# Patient Record
Sex: FEMALE | Race: WHITE | NOT HISPANIC OR LATINO | URBAN - METROPOLITAN AREA
[De-identification: names, ages, dates, MRNs, and addresses within clinical notes are randomized per-mention and may not be internally consistent; named-entity substitution may affect disease eponyms.]

---

## 2017-04-18 ENCOUNTER — RECORDS - HEALTHEAST (OUTPATIENT)
Dept: MAMMOGRAPHY | Facility: CLINIC | Age: 67
End: 2017-04-18

## 2017-04-18 DIAGNOSIS — Z12.31 ENCOUNTER FOR SCREENING MAMMOGRAM FOR MALIGNANT NEOPLASM OF BREAST: ICD-10-CM

## 2018-01-22 ENCOUNTER — RECORDS - HEALTHEAST (OUTPATIENT)
Dept: ADMINISTRATIVE | Facility: OTHER | Age: 68
End: 2018-01-22

## 2018-01-29 ENCOUNTER — HOSPITAL ENCOUNTER (OUTPATIENT)
Dept: CARDIOLOGY | Facility: HOSPITAL | Age: 68
Discharge: HOME | End: 2018-01-29

## 2018-01-29 ENCOUNTER — RECORDS - HEALTHEAST (OUTPATIENT)
Dept: ADMINISTRATIVE | Facility: OTHER | Age: 68
End: 2018-01-29

## 2018-01-29 DIAGNOSIS — I49.9 IRREGULAR HEART BEAT: ICD-10-CM

## 2018-01-29 RX ORDER — NAPROXEN SODIUM 550 MG/1
TABLET ORAL
Status: SHIPPED | COMMUNITY
Start: 2018-01-29

## 2018-01-29 RX ORDER — CLOBETASOL PROPIONATE 0.5 MG/G
OINTMENT TOPICAL
Status: SHIPPED | COMMUNITY
Start: 2018-01-29

## 2018-01-29 RX ORDER — NORTRIPTYLINE HCL 10 MG
10 CAPSULE ORAL
Status: SHIPPED | COMMUNITY
Start: 2018-01-29

## 2018-01-29 RX ORDER — POLYETHYLENE GLYCOL 3350 17 G/17G
17 POWDER, FOR SOLUTION ORAL WEEKLY
Status: SHIPPED | COMMUNITY
Start: 2018-01-29

## 2018-01-29 RX ORDER — CHLORAL HYDRATE 500 MG
2 CAPSULE ORAL EVERY OTHER DAY
Status: SHIPPED | COMMUNITY
Start: 2018-01-29

## 2018-01-29 RX ORDER — TRETINOIN 0.5 MG/G
CREAM TOPICAL PRN
Status: SHIPPED | COMMUNITY
Start: 2018-01-29

## 2018-01-30 LAB
CV STRESS CURRENT BP HE: NORMAL
CV STRESS CURRENT HR HE: 100
CV STRESS CURRENT HR HE: 100
CV STRESS CURRENT HR HE: 103
CV STRESS CURRENT HR HE: 105
CV STRESS CURRENT HR HE: 108
CV STRESS CURRENT HR HE: 108
CV STRESS CURRENT HR HE: 109
CV STRESS CURRENT HR HE: 109
CV STRESS CURRENT HR HE: 111
CV STRESS CURRENT HR HE: 112
CV STRESS CURRENT HR HE: 113
CV STRESS CURRENT HR HE: 115
CV STRESS CURRENT HR HE: 116
CV STRESS CURRENT HR HE: 122
CV STRESS CURRENT HR HE: 128
CV STRESS CURRENT HR HE: 129
CV STRESS CURRENT HR HE: 131
CV STRESS CURRENT HR HE: 133
CV STRESS CURRENT HR HE: 136
CV STRESS CURRENT HR HE: 139
CV STRESS CURRENT HR HE: 139
CV STRESS CURRENT HR HE: 141
CV STRESS CURRENT HR HE: 142
CV STRESS CURRENT HR HE: 144
CV STRESS CURRENT HR HE: 145
CV STRESS CURRENT HR HE: 87
CV STRESS CURRENT HR HE: 92
CV STRESS DEVIATION TIME HE: NORMAL
CV STRESS ECHO PERCENT HR HE: NORMAL
CV STRESS EXERCISE STAGE HE: NORMAL
CV STRESS EXERCISE STAGE REACHED HE: NORMAL
CV STRESS FINAL RESTING BP HE: NORMAL
CV STRESS FINAL RESTING HR HE: 100
CV STRESS MAX HR HE: 145
CV STRESS MAX TREADMILL GRADE HE: 14
CV STRESS MAX TREADMILL SPEED HE: 3.4
CV STRESS PEAK DIA BP HE: NORMAL
CV STRESS PEAK SYS BP HE: NORMAL
CV STRESS PHASE HE: NORMAL
CV STRESS PROTOCOL HE: NORMAL
CV STRESS RESTING PT POSITION HE: NORMAL
CV STRESS RESTING PT POSITION HE: NORMAL
CV STRESS ST DEVIATION AMOUNT HE: NORMAL
CV STRESS ST DEVIATION ELEVATION HE: NORMAL
CV STRESS ST EVELATION AMOUNT HE: NORMAL
CV STRESS TEST TYPE HE: NORMAL
CV STRESS TOTAL STAGE TIME MIN 1 HE: NORMAL
STRESS ECHO BASELINE BP: NORMAL
STRESS ECHO BASELINE HR: 93
STRESS ECHO CALCULATED PERCENT HR: 94 %
STRESS ECHO LAST STRESS BP: NORMAL
STRESS ECHO LAST STRESS HR: 145
STRESS ECHO POST ESTIMATED WORKLOAD: 10.1
STRESS ECHO POST EXERCISE DUR MIN: 8
STRESS ECHO POST EXERCISE DUR SEC: 20
STRESS ECHO TARGET HR: 144.76

## 2018-02-14 ENCOUNTER — RECORDS - HEALTHEAST (OUTPATIENT)
Dept: ADMINISTRATIVE | Facility: OTHER | Age: 68
End: 2018-02-14

## 2018-02-14 ENCOUNTER — AMBULATORY - HEALTHEAST (OUTPATIENT)
Dept: CARDIOLOGY | Facility: CLINIC | Age: 68
End: 2018-02-14

## 2018-02-21 ENCOUNTER — OFFICE VISIT - HEALTHEAST (OUTPATIENT)
Dept: CARDIOLOGY | Facility: CLINIC | Age: 68
End: 2018-02-21

## 2018-02-21 ENCOUNTER — COMMUNICATION - HEALTHEAST (OUTPATIENT)
Dept: CARDIOLOGY | Facility: CLINIC | Age: 68
End: 2018-02-21

## 2018-02-21 DIAGNOSIS — I49.3 ASYMPTOMATIC PVCS: ICD-10-CM

## 2018-02-21 DIAGNOSIS — R94.39 ABNORMAL STRESS ECG: ICD-10-CM

## 2018-02-21 LAB
ANION GAP SERPL CALCULATED.3IONS-SCNC: 7 MMOL/L (ref 5–18)
BUN SERPL-MCNC: 15 MG/DL (ref 8–22)
CALCIUM SERPL-MCNC: 10.1 MG/DL (ref 8.5–10.5)
CHLORIDE BLD-SCNC: 103 MMOL/L (ref 98–107)
CO2 SERPL-SCNC: 31 MMOL/L (ref 22–31)
CREAT SERPL-MCNC: 0.83 MG/DL (ref 0.6–1.1)
GFR SERPL CREATININE-BSD FRML MDRD: >60 ML/MIN/1.73M2
GLUCOSE BLD-MCNC: 78 MG/DL (ref 70–125)
MAGNESIUM SERPL-MCNC: 2.2 MG/DL (ref 1.8–2.6)
POTASSIUM BLD-SCNC: 5.3 MMOL/L (ref 3.5–5)
SODIUM SERPL-SCNC: 141 MMOL/L (ref 136–145)

## 2018-02-21 ASSESSMENT — MIFFLIN-ST. JEOR: SCORE: 1126.43

## 2018-02-27 ENCOUNTER — COMMUNICATION - HEALTHEAST (OUTPATIENT)
Dept: CARDIOLOGY | Facility: CLINIC | Age: 68
End: 2018-02-27

## 2018-03-01 ENCOUNTER — HOSPITAL ENCOUNTER (OUTPATIENT)
Dept: CT IMAGING | Facility: CLINIC | Age: 68
Discharge: HOME | End: 2018-03-01
Attending: INTERNAL MEDICINE

## 2018-03-01 ENCOUNTER — HOSPITAL ENCOUNTER (OUTPATIENT)
Dept: CARDIOLOGY | Facility: CLINIC | Age: 68
Discharge: HOME | End: 2018-03-01
Attending: INTERNAL MEDICINE

## 2018-03-01 DIAGNOSIS — I49.3 ASYMPTOMATIC PVCS: ICD-10-CM

## 2018-03-01 DIAGNOSIS — R94.39 ABNORMAL STRESS ECG: ICD-10-CM

## 2018-03-01 LAB
BSA FOR ECHO PROCEDURE: 1.65 M2
CV CALCIUM SCORE AGATSTON LM: 0
CV CALCIUM SCORING AGATSON LAD: 142
CV CALCIUM SCORING AGATSTON CX: 0
CV CALCIUM SCORING AGATSTON RCA: 16
CV CALCIUM SCORING AGATSTON TOTAL: 158
LEFT VENTRICLE HEART RATE: 72 BPM

## 2018-03-01 ASSESSMENT — MIFFLIN-ST. JEOR
SCORE: 1125.29
SCORE: 1126.43

## 2018-03-02 ENCOUNTER — AMBULATORY - HEALTHEAST (OUTPATIENT)
Dept: CARDIOLOGY | Facility: CLINIC | Age: 68
End: 2018-03-02

## 2018-03-02 DIAGNOSIS — I25.10 CAD (CORONARY ARTERY DISEASE): ICD-10-CM

## 2018-04-25 ENCOUNTER — OFFICE VISIT - HEALTHEAST (OUTPATIENT)
Dept: CARDIOLOGY | Facility: CLINIC | Age: 68
End: 2018-04-25

## 2018-04-25 DIAGNOSIS — I10 ESSENTIAL HYPERTENSION: ICD-10-CM

## 2018-04-25 DIAGNOSIS — I49.3 FREQUENT UNIFOCAL PREMATURE VENTRICULAR CONTRACTIONS: ICD-10-CM

## 2018-04-25 DIAGNOSIS — I25.10 CORONARY ARTERY DISEASE INVOLVING NATIVE CORONARY ARTERY WITHOUT ANGINA PECTORIS: ICD-10-CM

## 2018-04-25 ASSESSMENT — MIFFLIN-ST. JEOR: SCORE: 1126.31

## 2018-07-16 ENCOUNTER — COMMUNICATION - HEALTHEAST (OUTPATIENT)
Dept: CARDIOLOGY | Facility: CLINIC | Age: 68
End: 2018-07-16

## 2018-07-16 DIAGNOSIS — I10 ESSENTIAL HYPERTENSION: ICD-10-CM

## 2018-07-16 DIAGNOSIS — I25.10 CORONARY ARTERY DISEASE INVOLVING NATIVE CORONARY ARTERY WITHOUT ANGINA PECTORIS: ICD-10-CM

## 2018-07-16 DIAGNOSIS — I49.3 FREQUENT UNIFOCAL PREMATURE VENTRICULAR CONTRACTIONS: ICD-10-CM

## 2019-01-22 ENCOUNTER — COMMUNICATION - HEALTHEAST (OUTPATIENT)
Dept: CARDIOLOGY | Facility: CLINIC | Age: 69
End: 2019-01-22

## 2019-01-22 DIAGNOSIS — I25.10 CORONARY ARTERY DISEASE INVOLVING NATIVE CORONARY ARTERY WITHOUT ANGINA PECTORIS: ICD-10-CM

## 2019-01-22 DIAGNOSIS — I10 ESSENTIAL HYPERTENSION: ICD-10-CM

## 2019-01-22 DIAGNOSIS — I49.3 FREQUENT UNIFOCAL PREMATURE VENTRICULAR CONTRACTIONS: ICD-10-CM

## 2019-07-09 ENCOUNTER — RECORDS - HEALTHEAST (OUTPATIENT)
Dept: LAB | Facility: CLINIC | Age: 69
End: 2019-07-09

## 2019-07-10 ENCOUNTER — RECORDS - HEALTHEAST (OUTPATIENT)
Dept: LAB | Facility: CLINIC | Age: 69
End: 2019-07-10

## 2019-07-11 LAB
B BURGDOR IGG+IGM SER QL: 0.06 INDEX VALUE
BACTERIA SPEC CULT: ABNORMAL

## 2019-07-12 LAB
A PHAGOCYTOPH DNA BLD QL NAA+PROBE: NOT DETECTED
E CHAFFEENSIS DNA BLD QL NAA+PROBE: NOT DETECTED
E EWINGII DNA SPEC QL NAA+PROBE: NOT DETECTED
EHRLICHIA DNA SPEC QL NAA+PROBE: NOT DETECTED

## 2019-07-23 ENCOUNTER — COMMUNICATION - HEALTHEAST (OUTPATIENT)
Dept: CARDIOLOGY | Facility: CLINIC | Age: 69
End: 2019-07-23

## 2019-07-23 DIAGNOSIS — I10 ESSENTIAL HYPERTENSION: ICD-10-CM

## 2019-07-23 DIAGNOSIS — I49.3 FREQUENT UNIFOCAL PREMATURE VENTRICULAR CONTRACTIONS: ICD-10-CM

## 2019-07-23 DIAGNOSIS — I25.10 CORONARY ARTERY DISEASE INVOLVING NATIVE CORONARY ARTERY WITHOUT ANGINA PECTORIS: ICD-10-CM

## 2019-08-06 ENCOUNTER — RECORDS - HEALTHEAST (OUTPATIENT)
Dept: ADMINISTRATIVE | Facility: OTHER | Age: 69
End: 2019-08-06

## 2019-08-06 ENCOUNTER — AMBULATORY - HEALTHEAST (OUTPATIENT)
Dept: CARDIOLOGY | Facility: CLINIC | Age: 69
End: 2019-08-06

## 2019-08-15 ENCOUNTER — OFFICE VISIT - HEALTHEAST (OUTPATIENT)
Dept: CARDIOLOGY | Facility: CLINIC | Age: 69
End: 2019-08-15

## 2019-08-15 DIAGNOSIS — I49.3 FREQUENT UNIFOCAL PREMATURE VENTRICULAR CONTRACTIONS: ICD-10-CM

## 2019-08-15 DIAGNOSIS — I25.10 CORONARY ARTERY DISEASE INVOLVING NATIVE CORONARY ARTERY WITHOUT ANGINA PECTORIS: ICD-10-CM

## 2019-08-15 DIAGNOSIS — I10 ESSENTIAL HYPERTENSION: ICD-10-CM

## 2019-08-15 RX ORDER — NITROFURANTOIN 25; 75 MG/1; MG/1
1 CAPSULE ORAL 2 TIMES DAILY
Status: SHIPPED | COMMUNITY
Start: 2019-08-13

## 2019-08-15 ASSESSMENT — MIFFLIN-ST. JEOR: SCORE: 1125.52

## 2020-01-21 ENCOUNTER — COMMUNICATION - HEALTHEAST (OUTPATIENT)
Dept: CARDIOLOGY | Facility: CLINIC | Age: 70
End: 2020-01-21

## 2020-01-21 DIAGNOSIS — I25.10 CORONARY ARTERY DISEASE INVOLVING NATIVE CORONARY ARTERY WITHOUT ANGINA PECTORIS: ICD-10-CM

## 2020-01-21 DIAGNOSIS — I49.3 FREQUENT UNIFOCAL PREMATURE VENTRICULAR CONTRACTIONS: ICD-10-CM

## 2020-01-21 DIAGNOSIS — I10 ESSENTIAL HYPERTENSION: ICD-10-CM

## 2020-07-21 ENCOUNTER — COMMUNICATION - HEALTHEAST (OUTPATIENT)
Dept: CARDIOLOGY | Facility: CLINIC | Age: 70
End: 2020-07-21

## 2020-07-21 DIAGNOSIS — I49.3 FREQUENT UNIFOCAL PREMATURE VENTRICULAR CONTRACTIONS: ICD-10-CM

## 2020-07-21 DIAGNOSIS — I10 ESSENTIAL HYPERTENSION: ICD-10-CM

## 2020-07-21 DIAGNOSIS — I25.10 CORONARY ARTERY DISEASE INVOLVING NATIVE CORONARY ARTERY WITHOUT ANGINA PECTORIS: ICD-10-CM

## 2021-01-18 ENCOUNTER — COMMUNICATION - HEALTHEAST (OUTPATIENT)
Dept: CARDIOLOGY | Facility: CLINIC | Age: 71
End: 2021-01-18

## 2021-01-18 DIAGNOSIS — I10 ESSENTIAL HYPERTENSION: ICD-10-CM

## 2021-01-18 DIAGNOSIS — I49.3 FREQUENT UNIFOCAL PREMATURE VENTRICULAR CONTRACTIONS: ICD-10-CM

## 2021-01-18 DIAGNOSIS — I25.10 CORONARY ARTERY DISEASE INVOLVING NATIVE CORONARY ARTERY WITHOUT ANGINA PECTORIS: ICD-10-CM

## 2021-01-18 RX ORDER — ATORVASTATIN CALCIUM 20 MG/1
TABLET, FILM COATED ORAL
Qty: 90 TABLET | Refills: 0 | Status: SHIPPED | OUTPATIENT
Start: 2021-01-18

## 2021-01-18 RX ORDER — ATENOLOL 25 MG/1
25 TABLET ORAL DAILY
Qty: 90 TABLET | Refills: 0 | Status: SHIPPED | OUTPATIENT
Start: 2021-01-18

## 2021-04-15 ENCOUNTER — COMMUNICATION - HEALTHEAST (OUTPATIENT)
Dept: CARDIOLOGY | Facility: CLINIC | Age: 71
End: 2021-04-15

## 2021-04-15 DIAGNOSIS — I49.3 FREQUENT UNIFOCAL PREMATURE VENTRICULAR CONTRACTIONS: ICD-10-CM

## 2021-04-15 DIAGNOSIS — I10 ESSENTIAL HYPERTENSION: ICD-10-CM

## 2021-04-15 DIAGNOSIS — I25.10 CORONARY ARTERY DISEASE INVOLVING NATIVE CORONARY ARTERY WITHOUT ANGINA PECTORIS: ICD-10-CM

## 2021-05-03 ENCOUNTER — RECORDS - HEALTHEAST (OUTPATIENT)
Dept: LAB | Facility: CLINIC | Age: 71
End: 2021-05-03

## 2021-05-03 LAB
ANION GAP SERPL CALCULATED.3IONS-SCNC: 11 MMOL/L (ref 5–18)
BUN SERPL-MCNC: 19 MG/DL (ref 8–22)
CALCIUM SERPL-MCNC: 9.3 MG/DL (ref 8.5–10.5)
CHLORIDE BLD-SCNC: 102 MMOL/L (ref 98–107)
CHOLEST SERPL-MCNC: 149 MG/DL
CO2 SERPL-SCNC: 27 MMOL/L (ref 22–31)
CREAT SERPL-MCNC: 0.87 MG/DL (ref 0.6–1.1)
FASTING STATUS PATIENT QL REPORTED: NO
GFR SERPL CREATININE-BSD FRML MDRD: >60 ML/MIN/1.73M2
GLUCOSE BLD-MCNC: 89 MG/DL (ref 70–125)
HDLC SERPL-MCNC: 58 MG/DL
LDLC SERPL CALC-MCNC: 75 MG/DL
POTASSIUM BLD-SCNC: 4.6 MMOL/L (ref 3.5–5)
SODIUM SERPL-SCNC: 140 MMOL/L (ref 136–145)
TRIGL SERPL-MCNC: 82 MG/DL

## 2021-05-24 ENCOUNTER — RECORDS - HEALTHEAST (OUTPATIENT)
Dept: ADMINISTRATIVE | Facility: CLINIC | Age: 71
End: 2021-05-24

## 2021-05-25 ENCOUNTER — RECORDS - HEALTHEAST (OUTPATIENT)
Dept: ADMINISTRATIVE | Facility: CLINIC | Age: 71
End: 2021-05-25

## 2021-05-26 ENCOUNTER — RECORDS - HEALTHEAST (OUTPATIENT)
Dept: ADMINISTRATIVE | Facility: CLINIC | Age: 71
End: 2021-05-26

## 2021-05-27 ENCOUNTER — RECORDS - HEALTHEAST (OUTPATIENT)
Dept: ADMINISTRATIVE | Facility: CLINIC | Age: 71
End: 2021-05-27

## 2021-05-28 ENCOUNTER — RECORDS - HEALTHEAST (OUTPATIENT)
Dept: ADMINISTRATIVE | Facility: CLINIC | Age: 71
End: 2021-05-28

## 2021-05-29 ENCOUNTER — RECORDS - HEALTHEAST (OUTPATIENT)
Dept: ADMINISTRATIVE | Facility: CLINIC | Age: 71
End: 2021-05-29

## 2021-05-30 ENCOUNTER — RECORDS - HEALTHEAST (OUTPATIENT)
Dept: ADMINISTRATIVE | Facility: CLINIC | Age: 71
End: 2021-05-30

## 2021-05-31 ENCOUNTER — RECORDS - HEALTHEAST (OUTPATIENT)
Dept: ADMINISTRATIVE | Facility: CLINIC | Age: 71
End: 2021-05-31

## 2021-05-31 NOTE — PATIENT INSTRUCTIONS - HE
Katty Barraza    It was a pleasure to see you at [facility] today.    Your heart rhythm was quite regular today with no evidence for PVCs    Recommend a fasting lipid profile with your primary care physician  Call if symptoms of chest tightness with exertion or shortness of breath  Follow up appointment:   Jacky Nunez MD Dr. Dunbar if needed    Contact Ama at [phone] with questions or concerns.    Liban Valladares MD

## 2021-06-01 ENCOUNTER — RECORDS - HEALTHEAST (OUTPATIENT)
Dept: ADMINISTRATIVE | Facility: CLINIC | Age: 71
End: 2021-06-01

## 2021-06-01 VITALS — BODY MASS INDEX: 20.09 KG/M2 | WEIGHT: 128 LBS | HEIGHT: 67 IN

## 2021-06-01 VITALS — BODY MASS INDEX: 20.89 KG/M2 | WEIGHT: 130 LBS | HEIGHT: 66 IN

## 2021-06-01 VITALS — WEIGHT: 129.1 LBS | BODY MASS INDEX: 20.75 KG/M2 | HEIGHT: 66 IN

## 2021-06-02 ENCOUNTER — RECORDS - HEALTHEAST (OUTPATIENT)
Dept: ADMINISTRATIVE | Facility: CLINIC | Age: 71
End: 2021-06-02

## 2021-06-03 VITALS — HEIGHT: 66 IN | WEIGHT: 129.8 LBS | BODY MASS INDEX: 20.86 KG/M2

## 2021-06-16 PROBLEM — R94.39 ABNORMAL STRESS ECG: Status: ACTIVE | Noted: 2018-02-21

## 2021-06-16 PROBLEM — I25.10 CORONARY ARTERY DISEASE INVOLVING NATIVE CORONARY ARTERY WITHOUT ANGINA PECTORIS: Status: ACTIVE | Noted: 2018-04-25

## 2021-06-16 PROBLEM — I49.3 FREQUENT UNIFOCAL PREMATURE VENTRICULAR CONTRACTIONS: Status: ACTIVE | Noted: 2018-04-25

## 2021-06-16 PROBLEM — I10 ESSENTIAL HYPERTENSION: Status: ACTIVE | Noted: 2018-04-25

## 2021-06-16 NOTE — PROGRESS NOTES
[facility] Care Office Consult     Assessment:     1. Asymptomatic PVCs -appear to be single PVCs in the pattern of bigeminy which increased to couplets and triplets on stress test.  I will check renal profile and magnesium today as well as Holter monitor.  If the ectopy is greater than 10% will refer on to EP since it does appear to be right bundle branch block morphology.  If however less than 10% for greater than 5% try beta-blocker therapy.  If less than 5% would just continue to monitor.  Will inform her of results of blood test.   2. Abnormal stress ECG -minimal horizontal ST segment depression and she had normal coronary angiography in the mid to early 1990s.  Given the equivocal stress test will set her up for CTA.  A CTA declined will need nuclear stress test.      Plan:   1.  Check blood work today looking for magnesium potassium and correct if needed.  2.  Check Holter monitor and depending on percentage of ectopy refer on to EP or consider beta-blocker.  3.  Check CTA given equivocal stress test and address if abnormal.    History of Present Illness:   Thank you for asking the [facility] Care team to see Katty Barraza a 67 y.o.  female  in consultation  to evaluate PVCs.  Patient was being seen in early January due to an upper respiratory tract infection by her primary.  She had been taking DayQuil and NyQuil.  She was noted to have ectopy and ECG confirmed ventricular bigeminy.  She underwent echo showing no abnormalities and subsequent stress test showing couplets and triplets with some minimal ST segment depression.  She was referred here today to discuss further.  She admits to maybe feeling an occasional skip since the stress test over the last several weeks but denies any syncope, dizziness, PND, orthopnea, significant shortness of breath, increased use of caffeine, increased use of alcohol, or any other stimulants.    Past Medical History:   That is post spontaneous vaginal  "delivery    Past history is negative for cancer, tuberculosis, diabetes mellitus, myocardial infarction,  rheumatic fever, hypertension, cerebrovascular accident, chronic kidney disease, peptic ulcer disease, chronic obstructive pulmonary disease, or thyroid disorder  and no lipid disorder.    Past Surgical History:     Past Surgical History:   Procedure Laterality Date     CARDIAC CATHETERIZATION       Family History:   Family history negative for coronary artery disease.    Social History:   She is , lives independently at home with her , works as an , used to smoke up to a pack a day for maybe 10 years having quit 40 years ago, reports that she has quit smoking. She has never used smokeless tobacco.  Drinks a rare glass of wine and weekends.The primary care physician is Argentina Smith MD    Meds:   Scheduled Meds:  Current Outpatient Prescriptions   Medication Sig Dispense Refill     calcium carbonate-vitamin D2 500 mg(1,250mg) -200 unit tablet Take 2 tablets by mouth daily.       clobetasol (TEMOVATE) 0.05 % ointment Apply topically. As directed       cyanocobalamin 1000 MCG tablet Take 1,000 mcg by mouth daily.       naproxen sodium (ANAPROX) 550 MG tablet Take by mouth. 2 pills at onset of headache       nortriptyline (PAMELOR) 10 MG capsule Take 10 mg by mouth. 2 capsules once a day       OMEGA-3/DHA/EPA/FISH OIL (FISH OIL-OMEGA-3 FATTY ACIDS) 300-1,000 mg capsule Take 2 g by mouth every other day.       polyethylene glycol (GLYCOLAX) 17 gram/dose powder Take 17 g by mouth once a week.       tretinoin (RETIN-A) 0.05 % cream Apply topically as needed.       No current facility-administered medications for this visit.      PRN Meds:.    Allergies:   Augmentin [amoxicillin-pot clavulanate]    Objective:      Physical Exam  130 lb (59 kg)  5' 6\" (1.676 m)  Body mass index is 20.98 kg/(m^2).  /80 (Patient Site: Left Arm, Patient Position: Sitting, Cuff Size: Adult Regular)  Pulse " "74  Resp 16  Ht 5' 6\" (1.676 m)  Wt 130 lb (59 kg)  BMI 20.98 kg/m2    General Appearance:   Alert, cooperative and in no acute distress.   HEENT:  No scleral icterus; the mucous membranes were pink and moist.   Neck: JVP normal. No thyromegaly. No HJR   Chest: The spine was straight. The chest was symmetric.   Lungs:   Respirations unlabored; the lungs are clear to auscultation.   Cardiovascular:   S1 and S2 without murmur, clicks or rubs. Brachial, radial, carotid and posterior tibial pulses are intact and symetrical.  No carotid bruits noted   Abdomen:  No organomegaly, masses, bruits, or tenderness. Bowels sounds are present   Extremities: No cyanosis, clubbing, or edema.   Skin: No xanthelasma.   Neurologic: Mood and affect are appropriate.         Lab Reviewed Personally by myself  No results found for: NA, K, CL, CO2, BUN, CREATININE, GLUCOSE, CALCIUM  No results found for: WBC, HGB, HCT, MCV, PLT  No results found for: CHOL, TRIG, HDL, LDLDIRECT  No results found for: BNP    ECG personally reviewed by myself shows sinus rhythm with ventricular bigeminy and no acute changes.     Review of Systems:     Review of Systems:   General: WNL  Eyes: WNL  Ears/Nose/Throat: WNL  Lungs: WNL  Heart: WNL  Stomach: WNL  Bladder: WNL  Muscle/Joints: WNL  Skin: WNL  Nervous System: WNL  Mental Health: WNL     Blood: WNL  "

## 2021-06-16 NOTE — PROGRESS NOTES
CTA with calcium score complete.  Rhythm SR 70-80's.  Total metoprolol given is 7.5 mg IV.  Instructed to increase fluids throughout the day.  Interpretation pending.

## 2021-06-16 NOTE — PROGRESS NOTES
Notes Recorded by Mali Epperson MD on 3/1/2018 at 5:41 PM  Please notify patient that cta suggests no significant blockages but risk is increased so needs fasting lipids and primary or I can address.  LF      Orders placed for fasting lipid panel. Faxed to [facility]. -[facility]

## 2021-06-16 NOTE — TELEPHONE ENCOUNTER
Refuse refill for Atorvastatin atentolol. Pt has not seen cardiologist in over 1 year. Contact PCP.

## 2021-06-26 NOTE — PROGRESS NOTES
Progress Notes by Liban Valladares MD at 4/25/2018 10:30 AM     Author: Liban Valladares MD Service: -- Author Type: Physician    Filed: 4/25/2018 11:16 AM Encounter Date: 4/25/2018 Status: Signed    : Liban Valladares MD (Physician)           Click to link to [facility]     [facility] HEART CARE ELECTROPHYSIOLOGY CONSULTATION    Thank you, Dr. Epperson and Dr. Smith, for asking the [facility] Heart Care team to see Ms. Katty Barraza to evaluate frequent unifocal ventricular premature beats.      Assessment/Recommendations   Clinic Problem List:  1. Frequent unifocal premature ventricular contractions  atenolol (TENORMIN) 25 MG tablet   2. Coronary artery disease involving native coronary artery without angina pectoris  atorvastatin (LIPITOR) 20 MG tablet   3. Essential hypertension  atenolol (TENORMIN) 25 MG tablet       Assessment:    Benign ventricular ectopy arising from the right ventricular outflow tract, asymptomatic, and infrequent enough that the PVC induced cardiomyopathy is unlikely she has had normal left ventricular function.  Mild coronary disease on recent CTA  Borderline hypertension by history    Plan:  Start taking atenolol 25 mg daily  Start taking atorvastatin 20 mg daily  Follow up appointment:   Jacky Nunez MD for hypertension and hyperlipidemia  Dr. Valladares in 1 year         History of Present Illness     Katty Barraza is a 67 y.o. female with recurrent unifocal ventricular premature beats acted on a physical exam in January 2018 for symptoms of an upper respiratory tract infection.  She had no symptoms of palpitations at that time.  ECG January 4, 2018 showed normal sinus rhythm with ventricular bigeminy.  PVCs had a left bundle configuration transition in V4 and frontal plane axis of 45  consistent with ectopy lower in the right ventricular outflow tract.  She subsequently had a Holter monitor on March 1, 2018 which showed 11,800 PVCs over 24 hours predominantly in  "the form of ventricular trigeminy.  Ectopy was unifocal with the same morphology.  She described a syncopal episode in 2005 while riding a horse in a jumping competition and had some transient confusion following recovery but no diagnosis was made and there were no further syncopal or presyncopal episodes.  Cardiac echo 2/14/2018 showed ejection fraction of 60% and no valvular abnormalities.  She describes a normal coronary angiogram which was apparently normal approximately 20-25 years ago did not recall the initial indication.  Recent stress testing showed some ST segment depression.  Patient had CTA showed a calcium score at 75th percentile but minimal coronary artery disease in the proximal left anterior descending and distal right coronary arteries with lesions estimated at less than 25%.  She denies exertional chest pain and is able to walk a mile without symptoms.  She does describe some dyspnea on exertion with jogging.  Blood pressure and cholesterol readings have been \"high normal in the past\".  She had a minimal smoking history long ago and family history is remarkable only for strokes.  Personnaly reviewed: Studies described above       Physical Examination Review of Systems   Vitals:    04/25/18 1034   BP: 144/86   Pulse: 72   Resp: 16     Body mass index is 20.68 kg/(m^2).  Wt Readings from Last 3 Encounters:   04/25/18 129 lb 1.6 oz (58.6 kg)   03/01/18 128 lb (58.1 kg)   02/21/18 130 lb (59 kg)        Appearance:   no distress, elderly appearing   HEENT: no scleral icterus, normal conjunctivae    Neck: no carotid bruits or thyromegaly   Chest/Lungs:   lungs are clear to auscultation, no rales or wheezing,      Cardiovascular:   Jugular venous pressure 4 cm, Apical pulse is quite regular today.  Split S1,S2 with no murmurs or gallops,   Abdomen:  no  Hepatosplenomegaly., nontender,  bowel sounds are present   Extremities: no cyanosis or clubbing, No edema   Skin: no xanthelasma, warm.    Neurologic: " No gross focal neurologic deficits   Mood/Affect: Alert, cooperative    General: WNL  Eyes: WNL  Ears/Nose/Throat: WNL  Lungs: WNL  Heart: WNL  Stomach: WNL  Bladder: WNL  Muscle/Joints: WNL  Skin: WNL  Nervous System: WNL  Mental Health: WNL     Blood: WNL     Medical History  Surgical History Family History Social History   No past medical history on file. Past Surgical History:   Procedure Laterality Date   ? CARDIAC CATHETERIZATION      Family History   Problem Relation Age of Onset   ? Breast cancer Mother 60   ? Lung cancer Mother    ? Stuttering Mother    ? Stuttering Father    Both parents had strokes Social History     Social History   ? Marital status:      Spouse name: N/A   ? Number of children: N/A   ? Years of education: N/A     Occupational History   ? Not on file.     Social History Main Topics   ? Smoking status: Former Smoker     Quit date: 3/1/1972   ? Smokeless tobacco: Never Used   ? Alcohol use Not on file   ? Drug use: Not on file   ? Sexual activity: Not on file     Other Topics Concern   ? Not on file     Social History Narrative          Medications  Allergies   Current Outpatient Prescriptions   Medication Sig Dispense Refill   ? calcium carbonate-vitamin D2 500 mg(1,250mg) -200 unit tablet Take 2 tablets by mouth daily.     ? clobetasol (TEMOVATE) 0.05 % ointment Apply topically. As directed     ? cyanocobalamin 1000 MCG tablet Take 1,000 mcg by mouth daily.     ? nortriptyline (PAMELOR) 10 MG capsule Take 10 mg by mouth. 2 capsules once a day     ? OMEGA-3/DHA/EPA/FISH OIL (FISH OIL-OMEGA-3 FATTY ACIDS) 300-1,000 mg capsule Take 2 g by mouth every other day.     ? polyethylene glycol (GLYCOLAX) 17 gram/dose powder Take 17 g by mouth once a week.     ? tretinoin (RETIN-A) 0.05 % cream Apply topically as needed.     ? atenolol (TENORMIN) 25 MG tablet Take 1 tablet (25 mg total) by mouth daily. 90 tablet 1   ? atorvastatin (LIPITOR) 20 MG tablet Take 1 tablet (20 mg total) by mouth  at bedtime. 90 tablet 1   ? naproxen sodium (ANAPROX) 550 MG tablet Take by mouth. 2 pills at onset of headache       No current facility-administered medications for this visit.       Allergies   Allergen Reactions   ? Augmentin [Amoxicillin-Pot Clavulanate]      Side effects

## 2021-06-27 NOTE — PROGRESS NOTES
Progress Notes by Liban Valladares MD at 8/15/2019  8:50 AM     Author: Liban Valladares MD Service: -- Author Type: Physician    Filed: 8/15/2019  9:15 AM Encounter Date: 8/15/2019 Status: Signed    : Liban Valladares MD (Physician)           Click to link to [facility] HE[facility] ELECTROPHYSIOLOGY NOTE    Today I had the opportunity to see  Katty Barraza for follow-up evaluation of frequent of ventricular premature beats and asymptomatic coronary artery disease.      Assessment/Recommendations   Clinic Problem List:  1. Frequent unifocal premature ventricular contractions     2. Coronary artery disease involving native coronary artery without angina pectoris     3. Essential hypertension         Assessment:    History of frequent of ventricular premature beats.  No ectopy detected today and patient has had no symptoms  Coronary artery disease asymptomatic with high calcium score noted, on atorvastatin and not smoking  Hypertension reasonably well controlled    Plan:  Recommend a fasting lipid profile with your primary care physician  Call if symptoms of chest tightness with exertion or shortness of breath  Follow up appointment:   Jacky Nunez MD Dr. Dunbar if needed       History of Present Illness     Katty Barraza is a 69 y.o. female with frequent unifocal ventricular premature beats acted on a physical exam in January 2018 for symptoms of an upper respiratory tract infection.  She had no symptoms of palpitations at that time.  ECG January 4, 2018 showed normal sinus rhythm with ventricular bigeminy.  PVCs had a left bundle configuration transition in V4 and frontal plane axis of 45  consistent with ectopy lower in the right ventricular outflow tract.  She subsequently had a Holter monitor on March 1, 2018 which showed 11,800 PVCs over 24 hours predominantly in the form of ventricular trigeminy.  Ectopy was unifocal with the same morphology.  She described a syncopal  episode in 2005 while riding a horse in a jumping competition and had some transient confusion following recovery but no diagnosis was made and there were no further syncopal or presyncopal episodes.  Cardiac echo 2/14/2018 showed ejection fraction of 60% and no valvular abnormalities.  She describes a normal coronary angiogram which was apparently normal approximately 20-25 years ago did not recall the initial indication.  Recent stress testing showed some ST segment depression.  Patient had CTA showed a calcium score at 75th percentile but minimal coronary artery disease in the proximal left anterior descending and distal right coronary arteries with lesions estimated at less than 25%.    She was started on Atorvastatin given evidence for coronary artery disease and atenolol 25 mg daily.  Katty has felt well.  She is unaware of any palpitations or irregular pulse.  She states her home blood pressures run 130/80 or less.  She denies exertional chest pain dyspnea on exertion.  There is no syncope or presyncope.  I did not  find a follow-up fasting lipid profile since starting atorvastatin.     Physical Examination Review of Systems   Vitals:    08/15/19 0852   BP: 132/80   Pulse: 64   Resp: 16     Body mass index is 20.95 kg/m .  Wt Readings from Last 3 Encounters:   08/15/19 129 lb 12.8 oz (58.9 kg)   04/25/18 129 lb 1.6 oz (58.6 kg)   03/01/18 128 lb (58.1 kg)        Appearance:   no distress,    HEENT:   no scleral icterus, normal conjunctivae    Neck: no carotid bruits or thyromegaly   Chest/Lungs:   lungs are clear to auscultation, no rales or wheezing,    Cardiovascular:   Jugular venous pressure 4 cm, Apical pulse is 64 bpm and extremely regular with no extrasystoles detected over 30 seconds.  Split S1, normal S2 with no murmurs or gallops,   Abdomen:  no  Hepatosplenomegaly., nontender,  bowel sounds are present   Extremities: no cyanosis or clubbing, No edema   Skin: no xanthelasma, warm.    Neurologic: No  gross focal neurologic deficits   Mood/Affect: Alert, cooperative    General: WNL  Eyes: WNL  Ears/Nose/Throat: WNL  Lungs: WNL  Heart: WNL  Stomach: WNL  Bladder: Frequent Urination at Night  Muscle/Joints: WNL  Skin: WNL  Nervous System: WNL  Mental Health: WNL     Blood: WNL     Medical History  Surgical History Family History Social History   No past medical history on file. Past Surgical History:   Procedure Laterality Date   ? CARDIAC CATHETERIZATION      Family History   Problem Relation Age of Onset   ? Breast cancer Mother 60   ? Lung cancer Mother    ? Stuttering Mother    ? Stuttering Father     Social History     Socioeconomic History   ? Marital status:      Spouse name: Not on file   ? Number of children: Not on file   ? Years of education: Not on file   ? Highest education level: Not on file   Occupational History   ? Not on file   Social Needs   ? Financial resource strain: Not on file   ? Food insecurity:     Worry: Not on file     Inability: Not on file   ? Transportation needs:     Medical: Not on file     Non-medical: Not on file   Tobacco Use   ? Smoking status: Former Smoker     Last attempt to quit: 3/1/1972     Years since quittin.4   ? Smokeless tobacco: Never Used   Substance and Sexual Activity   ? Alcohol use: Not on file   ? Drug use: Not on file   ? Sexual activity: Not on file   Lifestyle   ? Physical activity:     Days per week: Not on file     Minutes per session: Not on file   ? Stress: Not on file   Relationships   ? Social connections:     Talks on phone: Not on file     Gets together: Not on file     Attends Oriental orthodox service: Not on file     Active member of club or organization: Not on file     Attends meetings of clubs or organizations: Not on file     Relationship status: Not on file   ? Intimate partner violence:     Fear of current or ex partner: Not on file     Emotionally abused: Not on file     Physically abused: Not on file     Forced sexual activity: Not on  file   Other Topics Concern   ? Not on file   Social History Narrative   ? Not on file          Medications  Allergies   Current Outpatient Medications   Medication Sig Dispense Refill   ? atenolol (TENORMIN) 25 MG tablet TAKE 1 TABLET (25 MG TOTAL) BY MOUTH DAILY. 90 tablet 0   ? atorvastatin (LIPITOR) 20 MG tablet TAKE 1 TABLET (20 MG TOTAL) BY MOUTH AT BEDTIME. 90 tablet 0   ? calcium carbonate-vitamin D2 500 mg(1,250mg) -200 unit tablet Take 2 tablets by mouth daily.     ? nitrofurantoin, macrocrystal-monohydrate, (MACROBID) 100 MG capsule Take 1 capsule by mouth 2 (two) times a day.     ? nortriptyline (PAMELOR) 10 MG capsule Take 10 mg by mouth. 2 capsules once a day     ? clobetasol (TEMOVATE) 0.05 % ointment Apply topically. As directed     ? cyanocobalamin 1000 MCG tablet Take 1,000 mcg by mouth daily.     ? naproxen sodium (ANAPROX) 550 MG tablet Take by mouth. 2 pills at onset of headache     ? OMEGA-3/DHA/EPA/FISH OIL (FISH OIL-OMEGA-3 FATTY ACIDS) 300-1,000 mg capsule Take 2 g by mouth every other day.     ? polyethylene glycol (GLYCOLAX) 17 gram/dose powder Take 17 g by mouth once a week.     ? tretinoin (RETIN-A) 0.05 % cream Apply topically as needed.       No current facility-administered medications for this visit.       Allergies   Allergen Reactions   ? Augmentin [Amoxicillin-Pot Clavulanate]      Side effects

## 2022-10-10 ENCOUNTER — LAB REQUISITION (OUTPATIENT)
Dept: LAB | Facility: CLINIC | Age: 72
End: 2022-10-10
Payer: COMMERCIAL

## 2022-10-10 DIAGNOSIS — E55.9 VITAMIN D DEFICIENCY, UNSPECIFIED: ICD-10-CM

## 2022-10-10 DIAGNOSIS — I10 ESSENTIAL (PRIMARY) HYPERTENSION: ICD-10-CM

## 2022-10-10 DIAGNOSIS — E53.8 DEFICIENCY OF OTHER SPECIFIED B GROUP VITAMINS: ICD-10-CM

## 2022-10-10 LAB
ALBUMIN SERPL BCG-MCNC: 4.3 G/DL (ref 3.5–5.2)
ALP SERPL-CCNC: 94 U/L (ref 35–104)
ALT SERPL W P-5'-P-CCNC: 22 U/L (ref 10–35)
ANION GAP SERPL CALCULATED.3IONS-SCNC: 10 MMOL/L (ref 7–15)
AST SERPL W P-5'-P-CCNC: 29 U/L (ref 10–35)
BILIRUB SERPL-MCNC: 0.6 MG/DL
BUN SERPL-MCNC: 15.4 MG/DL (ref 8–23)
CALCIUM SERPL-MCNC: 9.5 MG/DL (ref 8.8–10.2)
CHLORIDE SERPL-SCNC: 104 MMOL/L (ref 98–107)
CREAT SERPL-MCNC: 0.86 MG/DL (ref 0.51–0.95)
DEPRECATED HCO3 PLAS-SCNC: 27 MMOL/L (ref 22–29)
GFR SERPL CREATININE-BSD FRML MDRD: 72 ML/MIN/1.73M2
GLUCOSE SERPL-MCNC: 91 MG/DL (ref 70–99)
POTASSIUM SERPL-SCNC: 4.8 MMOL/L (ref 3.4–5.3)
PROT SERPL-MCNC: 6.6 G/DL (ref 6.4–8.3)
SODIUM SERPL-SCNC: 141 MMOL/L (ref 136–145)
VIT B12 SERPL-MCNC: 2146 PG/ML (ref 232–1245)

## 2022-10-10 PROCEDURE — 80053 COMPREHEN METABOLIC PANEL: CPT | Mod: ORL | Performed by: FAMILY MEDICINE

## 2022-10-10 PROCEDURE — 82306 VITAMIN D 25 HYDROXY: CPT | Mod: ORL | Performed by: FAMILY MEDICINE

## 2022-10-10 PROCEDURE — 82607 VITAMIN B-12: CPT | Mod: ORL | Performed by: FAMILY MEDICINE

## 2022-10-11 LAB — DEPRECATED CALCIDIOL+CALCIFEROL SERPL-MC: 45 UG/L (ref 20–75)

## 2022-10-20 ENCOUNTER — TRANSFERRED RECORDS (OUTPATIENT)
Dept: HEALTH INFORMATION MANAGEMENT | Facility: CLINIC | Age: 72
End: 2022-10-20

## 2023-12-15 ENCOUNTER — LAB REQUISITION (OUTPATIENT)
Dept: LAB | Facility: CLINIC | Age: 73
End: 2023-12-15
Payer: COMMERCIAL

## 2023-12-15 DIAGNOSIS — I10 ESSENTIAL (PRIMARY) HYPERTENSION: ICD-10-CM

## 2023-12-15 DIAGNOSIS — Z00.00 ENCOUNTER FOR GENERAL ADULT MEDICAL EXAMINATION WITHOUT ABNORMAL FINDINGS: ICD-10-CM

## 2023-12-15 LAB
ALBUMIN SERPL BCG-MCNC: 4.4 G/DL (ref 3.5–5.2)
ALP SERPL-CCNC: 79 U/L (ref 40–150)
ALT SERPL W P-5'-P-CCNC: 27 U/L (ref 0–50)
ANION GAP SERPL CALCULATED.3IONS-SCNC: 12 MMOL/L (ref 7–15)
AST SERPL W P-5'-P-CCNC: 33 U/L (ref 0–45)
BILIRUB SERPL-MCNC: 0.5 MG/DL
BUN SERPL-MCNC: 18.3 MG/DL (ref 8–23)
CALCIUM SERPL-MCNC: 9.6 MG/DL (ref 8.8–10.2)
CHLORIDE SERPL-SCNC: 103 MMOL/L (ref 98–107)
CHOLEST SERPL-MCNC: 150 MG/DL
CREAT SERPL-MCNC: 0.93 MG/DL (ref 0.51–0.95)
DEPRECATED HCO3 PLAS-SCNC: 27 MMOL/L (ref 22–29)
EGFRCR SERPLBLD CKD-EPI 2021: 65 ML/MIN/1.73M2
FASTING STATUS PATIENT QL REPORTED: NORMAL
GLUCOSE SERPL-MCNC: 95 MG/DL (ref 70–99)
HDLC SERPL-MCNC: 62 MG/DL
LDLC SERPL CALC-MCNC: 70 MG/DL
NONHDLC SERPL-MCNC: 88 MG/DL
POTASSIUM SERPL-SCNC: 4.7 MMOL/L (ref 3.4–5.3)
PROT SERPL-MCNC: 7.2 G/DL (ref 6.4–8.3)
SODIUM SERPL-SCNC: 142 MMOL/L (ref 135–145)
TRIGL SERPL-MCNC: 92 MG/DL
TSH SERPL DL<=0.005 MIU/L-ACNC: 2.09 UIU/ML (ref 0.3–4.2)

## 2023-12-15 PROCEDURE — 80061 LIPID PANEL: CPT | Mod: ORL | Performed by: FAMILY MEDICINE

## 2023-12-15 PROCEDURE — 80053 COMPREHEN METABOLIC PANEL: CPT | Mod: ORL | Performed by: FAMILY MEDICINE

## 2023-12-15 PROCEDURE — 84443 ASSAY THYROID STIM HORMONE: CPT | Mod: ORL | Performed by: FAMILY MEDICINE

## 2024-09-04 ENCOUNTER — TRANSFERRED RECORDS (OUTPATIENT)
Dept: HEALTH INFORMATION MANAGEMENT | Facility: CLINIC | Age: 74
End: 2024-09-04
Payer: COMMERCIAL

## 2025-02-11 ENCOUNTER — LAB REQUISITION (OUTPATIENT)
Dept: LAB | Facility: CLINIC | Age: 75
End: 2025-02-11
Payer: COMMERCIAL

## 2025-02-11 DIAGNOSIS — I10 ESSENTIAL (PRIMARY) HYPERTENSION: ICD-10-CM

## 2025-02-11 DIAGNOSIS — Z00.00 ENCOUNTER FOR GENERAL ADULT MEDICAL EXAMINATION WITHOUT ABNORMAL FINDINGS: ICD-10-CM

## 2025-02-12 LAB
ALBUMIN SERPL BCG-MCNC: 4.2 G/DL (ref 3.5–5.2)
ALP SERPL-CCNC: 76 U/L (ref 40–150)
ALT SERPL W P-5'-P-CCNC: 20 U/L (ref 0–50)
ANION GAP SERPL CALCULATED.3IONS-SCNC: 12 MMOL/L (ref 7–15)
AST SERPL W P-5'-P-CCNC: 25 U/L (ref 0–45)
BILIRUB SERPL-MCNC: 0.7 MG/DL
BUN SERPL-MCNC: 20.4 MG/DL (ref 8–23)
CALCIUM SERPL-MCNC: 9.3 MG/DL (ref 8.8–10.4)
CHLORIDE SERPL-SCNC: 103 MMOL/L (ref 98–107)
CHOLEST SERPL-MCNC: 142 MG/DL
CREAT SERPL-MCNC: 0.95 MG/DL (ref 0.51–0.95)
EGFRCR SERPLBLD CKD-EPI 2021: 63 ML/MIN/1.73M2
FASTING STATUS PATIENT QL REPORTED: NO
FASTING STATUS PATIENT QL REPORTED: NO
GLUCOSE SERPL-MCNC: 102 MG/DL (ref 70–99)
HCO3 SERPL-SCNC: 26 MMOL/L (ref 22–29)
HDLC SERPL-MCNC: 63 MG/DL
LDLC SERPL CALC-MCNC: 58 MG/DL
NONHDLC SERPL-MCNC: 79 MG/DL
POTASSIUM SERPL-SCNC: 4.2 MMOL/L (ref 3.4–5.3)
PROT SERPL-MCNC: 6.9 G/DL (ref 6.4–8.3)
SODIUM SERPL-SCNC: 141 MMOL/L (ref 135–145)
TRIGL SERPL-MCNC: 104 MG/DL
TSH SERPL DL<=0.005 MIU/L-ACNC: 1.53 UIU/ML (ref 0.3–4.2)